# Patient Record
Sex: MALE | Race: WHITE | NOT HISPANIC OR LATINO | Employment: FULL TIME | URBAN - METROPOLITAN AREA
[De-identification: names, ages, dates, MRNs, and addresses within clinical notes are randomized per-mention and may not be internally consistent; named-entity substitution may affect disease eponyms.]

---

## 2024-03-01 ENCOUNTER — OFFICE VISIT (OUTPATIENT)
Dept: FAMILY MEDICINE CLINIC | Facility: CLINIC | Age: 64
End: 2024-03-01
Payer: COMMERCIAL

## 2024-03-01 VITALS
SYSTOLIC BLOOD PRESSURE: 138 MMHG | DIASTOLIC BLOOD PRESSURE: 74 MMHG | HEIGHT: 71 IN | BODY MASS INDEX: 32.76 KG/M2 | WEIGHT: 234 LBS | HEART RATE: 74 BPM | OXYGEN SATURATION: 95 % | TEMPERATURE: 97.5 F

## 2024-03-01 DIAGNOSIS — M79.641 BILATERAL HAND PAIN: ICD-10-CM

## 2024-03-01 DIAGNOSIS — K21.9 GASTROESOPHAGEAL REFLUX DISEASE, UNSPECIFIED WHETHER ESOPHAGITIS PRESENT: ICD-10-CM

## 2024-03-01 DIAGNOSIS — H91.93 BILATERAL HEARING LOSS, UNSPECIFIED HEARING LOSS TYPE: ICD-10-CM

## 2024-03-01 DIAGNOSIS — R06.02 SHORTNESS OF BREATH: ICD-10-CM

## 2024-03-01 DIAGNOSIS — M79.642 BILATERAL HAND PAIN: ICD-10-CM

## 2024-03-01 DIAGNOSIS — R91.1 LUNG NODULE SEEN ON IMAGING STUDY: Primary | ICD-10-CM

## 2024-03-01 PROBLEM — I51.7 CONCENTRIC LEFT VENTRICULAR HYPERTROPHY: Status: ACTIVE | Noted: 2024-03-01

## 2024-03-01 PROBLEM — I10 PRIMARY HYPERTENSION: Status: ACTIVE | Noted: 2024-03-01

## 2024-03-01 PROCEDURE — 99204 OFFICE O/P NEW MOD 45 MIN: CPT | Performed by: NURSE PRACTITIONER

## 2024-03-01 RX ORDER — PANTOPRAZOLE SODIUM 20 MG/1
20 TABLET, DELAYED RELEASE ORAL DAILY
Qty: 30 TABLET | Refills: 2 | Status: SHIPPED | OUTPATIENT
Start: 2024-03-01

## 2024-03-01 RX ORDER — METOPROLOL SUCCINATE 100 MG/1
TABLET, EXTENDED RELEASE ORAL
COMMUNITY
Start: 2024-02-16

## 2024-03-01 RX ORDER — AMLODIPINE AND BENAZEPRIL HYDROCHLORIDE 10; 40 MG/1; MG/1
1 CAPSULE ORAL DAILY
COMMUNITY
Start: 2024-02-19

## 2024-03-01 RX ORDER — HYDRALAZINE HYDROCHLORIDE 100 MG/1
100 TABLET, FILM COATED ORAL 3 TIMES DAILY
COMMUNITY
Start: 2024-02-15

## 2024-03-01 RX ORDER — FUROSEMIDE 20 MG/1
1 TABLET ORAL DAILY
COMMUNITY
Start: 2024-02-23

## 2024-03-01 NOTE — PROGRESS NOTES
"Chief Complaint  Follow-up (He was seen in the the ER for pneumonia on 2/01/2024 for pneumonia. He said he has been sick for awhile and he has had cough and feels like heartburn. )    Subjective        Sukhdeep Boyd presents to [facility] PRIMARY [facility]ARE  History of Present Illness  Patient is here to establish care and follow up from ER visit 2/1/24. He does have a follow up with pulmonology and follow up testing in April. Intermittent cough and sputum, shortness of air with exertion same as when he was diagnosed with pneumonia, has completed medications prescribed at discharge. He did quit smoking at beginning of 2024.      He is having some heart burn, acid reflux, difficulty swallowing liquids. Denies anything worsening or improving, does not drink alcohol. He has been taking zantac with no improvement. His father had esophageal cancer.     Decreased hearing is also a concern, states it is chronic but worsening over last few years. Exposed to loud noises in . Hx of cerumen impaction.     Skin issue bilateral hands between fingers. Possible exposure to chemicals at his employment.     Some ongoing concern with bilateral hand pain, weakness, and stiffness since his pneumonia.     Objective   Vital Signs:  /74 (BP Location: Left arm, Patient Position: Sitting, Cuff Size: Adult)   Pulse 74   Temp 97.5 °F (36.4 °C) (Temporal)   Ht 180.3 cm (71\")   Wt 106 kg (234 lb)   SpO2 95%   BMI 32.64 kg/m²   Estimated body mass index is 32.64 kg/m² as calculated from the following:    Height as of this encounter: 180.3 cm (71\").    Weight as of this encounter: 106 kg (234 lb).       BMI is >= 30 and <35. (Class 1 Obesity). The following options were offered after discussion;: weight loss educational material (shared in after visit summary) and Information on healthy weight added to patient's after visit summary.      Physical Exam  Constitutional:       Appearance: Normal appearance.   HENT:      " Head: Normocephalic.      Right Ear: Ear canal normal. Decreased hearing noted. Tympanic membrane is scarred.      Left Ear: Tympanic membrane and ear canal normal. Decreased hearing noted.   Eyes:      Pupils: Pupils are equal, round, and reactive to light.   Cardiovascular:      Rate and Rhythm: Normal rate and regular rhythm.      Pulses:           Radial pulses are 2+ on the right side and 2+ on the left side.      Heart sounds: Normal heart sounds.   Pulmonary:      Effort: Pulmonary effort is normal.      Breath sounds: Examination of the right-middle field reveals decreased breath sounds. Examination of the left-middle field reveals decreased breath sounds. Examination of the right-lower field reveals decreased breath sounds. Examination of the left-lower field reveals decreased breath sounds.   Musculoskeletal:         General: Normal range of motion.      Cervical back: Normal range of motion.      Right lower leg: No edema.      Left lower leg: No edema.   Skin:     General: Skin is warm and dry.   Neurological:      General: No focal deficit present.      Mental Status: He is alert and oriented to person, place, and time.   Psychiatric:         Mood and Affect: Mood normal.        Result Review :                     Assessment and Plan     Diagnoses and all orders for this visit:    1. Lung nodule seen on imaging study (Primary)  -     Cancel: Ambulatory Referral to [facility]    2. Bilateral hearing loss, unspecified hearing loss type    3. Shortness of breath  -     Comprehensive metabolic panel  -     CBC w AUTO Differential    4. Gastroesophageal reflux disease, unspecified whether esophagitis present  -     Ambulatory Referral to Gastroenterology  -     pantoprazole (PROTONIX) 20 MG EC tablet; Take 1 tablet by mouth Daily.  Dispense: 30 tablet; Refill: 2    5. Bilateral hand pain  -     Uric acid  -     Rheumatoid Factor  -     C-reactive protein  -     CLEMENT  -     Sedimentation  rate, automated    Keep appointment with pulmonology for shortness of breath.     Patient can bring FMLA paperwork if desired.          Follow Up     Return in about 4 weeks (around 3/29/2024) for Recheck.  Patient was given instructions and counseling regarding his condition or for health maintenance advice. Please see specific information pulled into the AVS if appropriate.

## 2024-03-04 ENCOUNTER — TELEPHONE (OUTPATIENT)
Dept: FAMILY MEDICINE CLINIC | Facility: CLINIC | Age: 64
End: 2024-03-04
Payer: COMMERCIAL

## 2024-03-04 LAB
ALBUMIN SERPL-MCNC: 3.9 G/DL (ref 3.9–4.9)
ALBUMIN/GLOB SERPL: 1.7 {RATIO} (ref 1.2–2.2)
ALP SERPL-CCNC: 86 IU/L (ref 44–121)
ALT SERPL-CCNC: 18 IU/L (ref 0–44)
ANA SER QL: NEGATIVE
AST SERPL-CCNC: 19 IU/L (ref 0–40)
BASOPHILS # BLD AUTO: 0 X10E3/UL (ref 0–0.2)
BASOPHILS NFR BLD AUTO: 0 %
BILIRUB SERPL-MCNC: 0.9 MG/DL (ref 0–1.2)
BUN SERPL-MCNC: 14 MG/DL (ref 8–27)
BUN/CREAT SERPL: 12 (ref 10–24)
CALCIUM SERPL-MCNC: 8.8 MG/DL (ref 8.6–10.2)
CHLORIDE SERPL-SCNC: 102 MMOL/L (ref 96–106)
CO2 SERPL-SCNC: 25 MMOL/L (ref 20–29)
CREAT SERPL-MCNC: 1.14 MG/DL (ref 0.76–1.27)
CRP SERPL-MCNC: 60 MG/L (ref 0–10)
EGFRCR SERPLBLD CKD-EPI 2021: 73 ML/MIN/1.73
EOSINOPHIL # BLD AUTO: 0.4 X10E3/UL (ref 0–0.4)
EOSINOPHIL NFR BLD AUTO: 6 %
ERYTHROCYTE [DISTWIDTH] IN BLOOD BY AUTOMATED COUNT: 12.2 % (ref 11.6–15.4)
ERYTHROCYTE [SEDIMENTATION RATE] IN BLOOD BY WESTERGREN METHOD: 28 MM/HR (ref 0–30)
GLOBULIN SER CALC-MCNC: 2.3 G/DL (ref 1.5–4.5)
GLUCOSE SERPL-MCNC: 112 MG/DL (ref 70–99)
HCT VFR BLD AUTO: 37.7 % (ref 37.5–51)
HGB BLD-MCNC: 12.8 G/DL (ref 13–17.7)
IMM GRANULOCYTES # BLD AUTO: 0 X10E3/UL (ref 0–0.1)
IMM GRANULOCYTES NFR BLD AUTO: 1 %
LYMPHOCYTES # BLD AUTO: 1.1 X10E3/UL (ref 0.7–3.1)
LYMPHOCYTES NFR BLD AUTO: 18 %
MCH RBC QN AUTO: 29.4 PG (ref 26.6–33)
MCHC RBC AUTO-ENTMCNC: 34 G/DL (ref 31.5–35.7)
MCV RBC AUTO: 87 FL (ref 79–97)
MONOCYTES # BLD AUTO: 0.7 X10E3/UL (ref 0.1–0.9)
MONOCYTES NFR BLD AUTO: 11 %
NEUTROPHILS # BLD AUTO: 3.9 X10E3/UL (ref 1.4–7)
NEUTROPHILS NFR BLD AUTO: 64 %
PLATELET # BLD AUTO: 139 X10E3/UL (ref 150–450)
POTASSIUM SERPL-SCNC: 3.6 MMOL/L (ref 3.5–5.2)
PROT SERPL-MCNC: 6.2 G/DL (ref 6–8.5)
RBC # BLD AUTO: 4.35 X10E6/UL (ref 4.14–5.8)
RHEUMATOID FACT SERPL-ACNC: 11.1 IU/ML
SODIUM SERPL-SCNC: 138 MMOL/L (ref 134–144)
URATE SERPL-MCNC: 4.8 MG/DL (ref 3.8–8.4)
WBC # BLD AUTO: 6.1 X10E3/UL (ref 3.4–10.8)

## 2024-03-04 NOTE — TELEPHONE ENCOUNTER
[facility]MT[facility]    **HUB/FO** MAY RELAY MESSAGE    ----- Message from YANI Zavala sent at 3/4/2024  1:01 PM EST -----    Let patient know that his labs for arthritis is negative for immune related arthritis. His hemoglobin is just slightly low, we can recheck at follow up to make sure it improves. Thanks.

## 2024-03-11 ENCOUNTER — TELEPHONE (OUTPATIENT)
Dept: FAMILY MEDICINE CLINIC | Facility: CLINIC | Age: 64
End: 2024-03-11
Payer: COMMERCIAL

## 2024-03-11 NOTE — TELEPHONE ENCOUNTER
Spoke melany Valles-she wants an extension on his FMLA due to his recovery being stalled.  He is still experiencing difficulty breathing and on/off fever.  She is also wondering if you would send in an inhaler for his breathing?  Please advise.    Dry cough  Can't lie flat or on side without coughing terribly.      [facility] YOGI Valles notified and voiced understanding.  Pt scheduled for 3/14-9:45 am at [facility].  She would like a call when we receive fax of new FMLA.  [facility] YOGI

## 2024-03-11 NOTE — TELEPHONE ENCOUNTER
Caller: fely [email or web address]    Relationship: Emergency Contact    Best call back number: [phone]    Who are you requesting to speak with (clinical staff, provider,  specific staff member): HARPREET AMADOR     What was the call regarding: EXTENDING FMLA PAPERWORK

## 2024-03-14 ENCOUNTER — OFFICE VISIT (OUTPATIENT)
Dept: FAMILY MEDICINE CLINIC | Facility: CLINIC | Age: 64
End: 2024-03-14
Payer: COMMERCIAL

## 2024-03-14 VITALS
BODY MASS INDEX: 32.03 KG/M2 | SYSTOLIC BLOOD PRESSURE: 100 MMHG | OXYGEN SATURATION: 95 % | WEIGHT: 228.8 LBS | DIASTOLIC BLOOD PRESSURE: 60 MMHG | HEART RATE: 75 BPM | TEMPERATURE: 98 F | HEIGHT: 71 IN

## 2024-03-14 DIAGNOSIS — R05.3 CHRONIC COUGH: Primary | ICD-10-CM

## 2024-03-14 PROCEDURE — 99213 OFFICE O/P EST LOW 20 MIN: CPT | Performed by: NURSE PRACTITIONER

## 2024-03-14 RX ORDER — BENZONATATE 100 MG/1
100 CAPSULE ORAL 3 TIMES DAILY PRN
Qty: 30 CAPSULE | Refills: 0 | Status: SHIPPED | OUTPATIENT
Start: 2024-03-14

## 2024-03-14 RX ORDER — DEXTROMETHORPHAN HYDROBROMIDE AND PROMETHAZINE HYDROCHLORIDE 15; 6.25 MG/5ML; MG/5ML
5 SYRUP ORAL 4 TIMES DAILY PRN
Qty: 180 ML | Refills: 0 | Status: SHIPPED | OUTPATIENT
Start: 2024-03-14

## 2024-03-14 NOTE — PROGRESS NOTES
"Chief Complaint  Cough (X 1 week coughing and throwing up )    Subjective        Sukhdeep Boyd presents to [facility] PRIMARY CARE  Cough    Patient is following up. Still having coughing spells. He is still having clear phlegm. Still seems to be worse at night. He is having some pain due to the coughing. He does occasionally throw up if he has eaten prior to a coughing spell. He notices if he reclines any or lays down the cough is worse.     Also [place] some further [place] for employer.   Objective   Vital Signs:  /60 (BP Location: Left arm, Patient Position: Sitting, Cuff Size: Adult)   Pulse 75   Temp 98 °F (36.7 °C)   Ht 180.3 cm (71\")   Wt 104 kg (228 lb 12.8 oz)   SpO2 95%   BMI 31.91 kg/m²   Estimated body mass index is 31.91 kg/m² as calculated from the following:    Height as of this encounter: 180.3 cm (71\").    Weight as of this encounter: 104 kg (228 lb 12.8 oz).               Physical Exam  Constitutional:       Appearance: Normal appearance.   HENT:      Head: Normocephalic.   Eyes:      Extraocular Movements: Extraocular movements intact.      Pupils: Pupils are equal, round, and reactive to light.   Cardiovascular:      Rate and Rhythm: Normal rate and regular rhythm.      Heart sounds: Normal heart sounds.   Pulmonary:      Effort: Pulmonary effort is normal.      Breath sounds: Examination of the right-lower field reveals decreased breath sounds. Examination of the left-lower field reveals decreased breath sounds. Decreased breath sounds present.   Musculoskeletal:         General: Normal range of motion.      Cervical back: Normal range of motion.   Skin:     General: Skin is warm and dry.   Neurological:      General: No focal deficit present.      Mental Status: He is alert and oriented to person, place, and time.   Psychiatric:         Mood and Affect: Mood normal.        Result Review :                     Assessment and Plan     Diagnoses and all " orders for this visit:    1. Chronic cough (Primary)  -     benzonatate (Tessalon Perles) 100 MG capsule; Take 1 capsule by mouth 3 (Three) Times a Day As Needed for Cough.  Dispense: 30 capsule; Refill: 0  -     promethazine-dextromethorphan (PROMETHAZINE-DM) 6.25-15 MG/5ML syrup; Take 5 mL by mouth 4 (Four) Times a Day As Needed for Cough.  Dispense: 180 mL; Refill: 0    Defers follow up xray today as he has a pending pulmonology consult in a few weeks.          Follow Up     Return if symptoms worsen or fail to improve.  Patient was given instructions and counseling regarding his condition or for health maintenance advice. Please see specific information pulled into the AVS if appropriate.

## 2024-03-25 ENCOUNTER — TELEPHONE (OUTPATIENT)
Dept: FAMILY MEDICINE CLINIC | Facility: CLINIC | Age: 64
End: 2024-03-25

## 2024-03-25 NOTE — TELEPHONE ENCOUNTER
Caller: ramila fely    Relationship: Emergency Contact    Best call back number:     [phone] (Home)       What form or medical record are you requesting: EXTENSION ON HIS FMLA PAPERWORK  / FOR 2 MORE WEEKS    THE FORMS ARE BEING FAXED OVER AT THIS TIME     DISCHARGED FROM HOSPITAL ON 20TH / HEART FAILURE      Who is requesting this form or medical record from you: EMPLOYER      How would you like to receive the form or medical records (pick-up, mail, fax):   Timeframe paperwork needed: ASAP     Additional notes: CAN YOU CALL AND CONFIRM THAT IT HAS BEEN RECEIVED     3/15/2024  [facility] Cardiac Progressive Care Unit

## 2024-03-25 NOTE — TELEPHONE ENCOUNTER
LEFT MESSAGE THAT PAPER WORK WAS FAXED - IF PATIENT WANTS A COPY HE JUST NEEDS TO STOP AT THE [facility] OFFICE AND REQUEST THE COPY        **HUB/FO** MAY RELAY MESSAGE

## 2024-04-05 ENCOUNTER — OFFICE VISIT (OUTPATIENT)
Dept: FAMILY MEDICINE CLINIC | Facility: CLINIC | Age: 64
End: 2024-04-05
Payer: COMMERCIAL

## 2024-04-05 VITALS
BODY MASS INDEX: 29.4 KG/M2 | DIASTOLIC BLOOD PRESSURE: 60 MMHG | WEIGHT: 210.8 LBS | TEMPERATURE: 97.1 F | OXYGEN SATURATION: 97 % | HEART RATE: 82 BPM | SYSTOLIC BLOOD PRESSURE: 100 MMHG

## 2024-04-05 DIAGNOSIS — R06.02 SHORTNESS OF BREATH: ICD-10-CM

## 2024-04-05 DIAGNOSIS — J90 PLEURAL EFFUSION, BILATERAL: ICD-10-CM

## 2024-04-05 DIAGNOSIS — R05.3 CHRONIC COUGH: ICD-10-CM

## 2024-04-05 DIAGNOSIS — I30.9 ACUTE PERICARDITIS, UNSPECIFIED TYPE: ICD-10-CM

## 2024-04-05 DIAGNOSIS — Z09 HOSPITAL DISCHARGE FOLLOW-UP: Primary | ICD-10-CM

## 2024-04-05 RX ORDER — BENZONATATE 100 MG/1
100 CAPSULE ORAL 3 TIMES DAILY PRN
Qty: 90 CAPSULE | Refills: 0 | Status: SHIPPED | OUTPATIENT
Start: 2024-04-05

## 2024-04-05 NOTE — PROGRESS NOTES
Transitional Care Follow Up Visit  Subjective     Sukhdeep Boyd is a 63 y.o. male who presents for a transitional care management visit.    Within 48 business hours after discharge our office contacted him via telephone to coordinate his care and needs.      I reviewed and discussed the details of that call along with the discharge summary, hospital problems, inpatient lab results, inpatient diagnostic studies, and consultation reports with Sukhdeep.     Current outpatient and discharge medications have been reconciled for the patient.  Reviewed by: YANI Zavala           No data to display              Risk for Readmission (LACE) No data recorded    History of Present Illness   Patient is here to follow up from hospital. Still having difficulty breathing. He is upset as he feels he still is coughing too much. 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 He stopped taking hydralazine as he thinks there is a connection from it being increased to 100mg from 50mg and his fluid build up. He also feels his bp readings actually improved when he stopped that medication. He has a pending CT chest on Wednesday. Patient and significant other feel that he still has right lung fluid that needs removal.  He is also not taking the colchicine due to some feelings of sore throat and some other side effects. He is losing weight and has a decreased appetite.     Course During Hospital Stay:  63-year-old male with a history of hypertension, hypothyroidism, obesity and former smoker presented with shortness of breath. Patient stated that he has been struggling with pneumonia since February and this has not improved despite treatment with antibiotics. He was then again diagnosed with PNA around March 5 but continues to have shortness of breath associated with a cough, fevers (reported Tmax of 101) and dizziness. Patient has tried  over-the-counter medicines for his symptoms with no significant improvement. Patient finally presented to the emergency room due to the severity of his symptoms. He states that he has a stabbing pain in the right chest when he takes a deep breath.     Hospital Course:   -Patient was admitted to the Hospitalist service. Echo revealed pericardial effusion. Cardiology was consulted. He underwent:    --pericardiocentesis 3/16/24 - 650 cc of straw-colored fluid. Drain removed 3/17/24  --f/u ECHO in 2-4 weeks  --colchicine daily, cont 2-4 weeks, can hold if outpatient ECHO normal    He responded well to colchicine and was hemodynamically stable on RA. He will follow up with cardiology in a few weeks. The patient's laboratory data and vital signs remained stable for discharge      The following portions of the patient's history were reviewed and updated as appropriate: allergies, current medications, past family history, past medical history, past social history, past surgical history, and problem list.    Review of Systems    Objective   Physical Exam  Constitutional:       Appearance: Normal appearance. He is ill-appearing.   HENT:      Head: Normocephalic.      Nose: Nose normal.   Eyes:      Extraocular Movements: Extraocular movements intact.      Pupils: Pupils are equal, round, and reactive to light.   Cardiovascular:      Rate and Rhythm: Normal rate and regular rhythm.      Heart sounds: Normal heart sounds.   Pulmonary:      Effort: Pulmonary effort is normal.      Breath sounds: Normal breath sounds.   Musculoskeletal:         General: Normal range of motion.      Cervical back: Normal range of motion.   Skin:     General: Skin is warm and dry.   Neurological:      General: No focal deficit present.      Mental Status: He is alert and oriented to person, place, and time.   Psychiatric:         Mood and Affect: Mood normal.         Assessment & Plan   Problems Addressed this Visit    None  Visit Diagnoses        Hospital discharge follow-up    -  Primary    Acute pericarditis, unspecified type        Relevant Orders    Ambulatory Referral to Cardiology    Shortness of breath        Relevant Orders    Ambulatory Referral to Pulmonology    Pleural effusion, bilateral        Relevant Medications    benzonatate (Tessalon Perles) 100 MG capsule    Chronic cough        Relevant Medications    benzonatate (Tessalon Perles) 100 MG capsule          Diagnoses         Codes Comments    Hospital discharge follow-up    -  Primary ICD-10-CM: Z09  ICD-9-CM: V67.59     Acute pericarditis, unspecified type     ICD-10-CM: I30.9  ICD-9-CM: 420.90     Shortness of breath     ICD-10-CM: R06.02  ICD-9-CM: 786.05     Pleural effusion, bilateral     ICD-10-CM: J90  ICD-9-CM: 511.9     Chronic cough     ICD-10-CM: R05.3  ICD-9-CM: 786.2           Patient is to keep imaging appointments.

## 2024-04-12 ENCOUNTER — OFFICE VISIT (OUTPATIENT)
Dept: CARDIOLOGY | Facility: CLINIC | Age: 64
End: 2024-04-12
Payer: COMMERCIAL

## 2024-04-12 VITALS
WEIGHT: 214 LBS | SYSTOLIC BLOOD PRESSURE: 142 MMHG | HEIGHT: 71 IN | DIASTOLIC BLOOD PRESSURE: 86 MMHG | HEART RATE: 86 BPM | BODY MASS INDEX: 29.96 KG/M2 | OXYGEN SATURATION: 97 %

## 2024-04-12 DIAGNOSIS — I30.0 ACUTE IDIOPATHIC PERICARDITIS: ICD-10-CM

## 2024-04-12 DIAGNOSIS — K21.9 GASTROESOPHAGEAL REFLUX DISEASE, UNSPECIFIED WHETHER ESOPHAGITIS PRESENT: ICD-10-CM

## 2024-04-12 DIAGNOSIS — Z98.890 S/P THORACENTESIS: ICD-10-CM

## 2024-04-12 DIAGNOSIS — I10 PRIMARY HYPERTENSION: Primary | ICD-10-CM

## 2024-04-12 DIAGNOSIS — I31.39 PERICARDIAL EFFUSION: ICD-10-CM

## 2024-04-12 DIAGNOSIS — Z98.890 S/P PERICARDIOCENTESIS: ICD-10-CM

## 2024-04-12 NOTE — PROGRESS NOTES
"      CARDIOLOGY    Ron Cm MD    ENCOUNTER DATE:  04/12/2024    Sukhdeep Boyd / 63 y.o. / male        CHIEF COMPLAINT / REASON FOR OFFICE VISIT     Establish Care (Pericarditis)      HISTORY OF PRESENT ILLNESS       HPI    Sukhdeep Boyd is a 63 y.o. male with HFpEF, pericardial effusion s/p pericardiocentesis, hypertension, overweight, tobacco abuse who presents to establish care as a new patient for pericarditis.    Per CareEverywhere, pt was hospitalized in 2/2024 for pneumonia and then 3/15/2024 - 3/20/2024 at [facility] for new onset of HFpEF with pericardial effusion and pleural effusions. He underwent pericardiocentesis on 316/24 with removal of 650 mL of straw-colored fluid. He was started on colchicine daily. He underwent thoracentesis of left pleural effusion on 3/18 with removal of 1300 mL of clear straw-colored fluid. He was discharged on colchicine 0.6 mg twice daily with order for follow-up echo for surveillance.    Accompanied by wife at the visit today who contributed to medical history. Today, patient has no acute complaints. Works as a truck and crane . Continues to have a cough which started 2 months ago. Persistent dyspnea and R \"lung pain\", overall stable. Reports some associated R-sided chest discomfort with excessive coughing, unsure if any worse; per wife, it appears to be slowly improving.  Denies palpitation, leg edema, PND. Reports some dizziness with over exertion but no syncope or unexpected falls. Unsure if he has low grade fever. Does not recall having had a heart attack or stroke. Former smoker, 0.5 ppd x 47 years and quit at the beginning of the year. Denies alcohol or substance.     REVIEW OF SYSTEMS     Review of Systems   Constitutional: Negative for weight loss.   Cardiovascular:  Positive for chest pain and dyspnea on exertion. Negative for irregular heartbeat, leg swelling, orthopnea, palpitations, paroxysmal nocturnal dyspnea and syncope.   Respiratory:  Positive for cough, " Spoke with Mr. Lynnette Carmona discussed what CTA is and that the insurance verifiers will verify insurance coverage. transferred to central scheduling. "shortness of breath and sputum production.    Hematologic/Lymphatic: Negative for bleeding problem.   Musculoskeletal:  Negative for falls.   Gastrointestinal:  Negative for hematochezia and melena.   Genitourinary:  Negative for hematuria.   Neurological:  Negative for dizziness and light-headedness.   Psychiatric/Behavioral:  Negative for altered mental status.            MEDICATIONS      Outpatient Encounter Medications as of 4/12/2024   Medication Sig Dispense Refill    amLODIPine-benazepril (LOTREL) 10-40 MG per capsule Take 1 capsule by mouth Daily.      benzonatate (Tessalon Perles) 100 MG capsule Take 1 capsule by mouth 3 (Three) Times a Day As Needed for Cough. 90 capsule 0    furosemide (LASIX) 20 MG tablet Take 1 tablet by mouth Daily.      metoprolol succinate XL (TOPROL-XL) 100 MG 24 hr tablet        No facility-administered encounter medications on file as of 4/12/2024.         VITAL SIGNS     Visit Vitals  /86   Pulse 86   Ht 180.3 cm (71\")   Wt 97.1 kg (214 lb)   SpO2 97%   BMI 29.85 kg/m²         Wt Readings from Last 3 Encounters:   04/12/24 97.1 kg (214 lb)   04/05/24 95.6 kg (210 lb 12.8 oz)   03/14/24 104 kg (228 lb 12.8 oz)     Body mass index is 29.85 kg/m².      PHYSICAL EXAMINATION     Vitals and nursing note reviewed.   Constitutional:       Appearance: Not in distress.   Neck:      Vascular: No JVR.   Pulmonary:      Effort: Pulmonary effort is normal.      Breath sounds: Normal breath sounds. No wheezing. No rhonchi. No rales.   Cardiovascular:      Normal rate. Regular rhythm.      Murmurs: There is no murmur.   Edema:     Peripheral edema absent.   Abdominal:      General: There is no distension.      Palpations: Abdomen is soft.      Tenderness: There is no abdominal tenderness.   Musculoskeletal:      Cervical back: Neck supple. Skin:     General: Skin is cool.   Neurological:      Mental Status: Alert and oriented to person, place and time.           REVIEWED DATA       ECG 12 " "Lead    Date/Time: 4/12/2024 12:09 PM  Performed by: Ron Cm MD    Authorized by: Ron Cm MD  Comparison: not compared with previous ECG   Rhythm: sinus rhythm  Ectopy: atrial premature contractions  Conduction: conduction normal  ST Segments: ST segments normal  T Waves: T waves normal  QRS axis: normal  Other findings: left atrial abnormality    Clinical impression: non-specific ECG          Lab Results   Component Value Date    WBC 7.56 03/20/2024    HGB 11.0 (L) 03/20/2024    HCT 33.0 (L) 03/20/2024    MCV 82.3 03/20/2024     03/20/2024       No results found for: \"HGBA1C\"    Lab Results   Component Value Date    GLUCOSE 112 (H) 03/01/2024    BUN 14 03/01/2024    CREATININE 1.14 03/01/2024    EGFRRESULT 73 03/01/2024    BCR 12 03/01/2024    K 4.2 03/18/2024    CO2 25 03/01/2024    CALCIUM 8.8 03/01/2024    PROTENTOTREF 6.2 03/01/2024    ALBUMIN 3.9 03/01/2024    BILITOT 0.9 03/01/2024    AST 19 03/01/2024    ALT 18 03/01/2024       No results found for: \"CHOL\", \"CHLPL\", \"TRIG\", \"HDL\", \"LDL\", \"LDLDIRECT\"          ASSESSMENT & PLAN     Diagnoses and all orders for this visit:    1. Primary hypertension (Primary)  -     XR Chest 2 View; Future  -     Basic Metabolic Panel; Future  -     CBC & Differential; Future  -     High Sensitivity CRP; Future  -     Sedimentation Rate; Future  -     Lipid Panel; Future  -     ECG 12 Lead    2. Gastroesophageal reflux disease, unspecified whether esophagitis present  -     XR Chest 2 View; Future  -     Basic Metabolic Panel; Future  -     CBC & Differential; Future  -     High Sensitivity CRP; Future  -     Sedimentation Rate; Future  -     Lipid Panel; Future    3. Pericardial effusion  -     ECG 12 Lead    4. S/P pericardiocentesis    5. S/P thoracentesis    6. Acute idiopathic pericarditis       Pericardial effusion s/p pericardiocentesis: Recent hospitalization at [facility] 3/2024 for dyspnea with new onset pericardial effusion requiring pericardiocentesis " and removal of 650 cc of straw-colored fluid.  Patient had symptoms concerning for associated pericarditis and was on colchicine 0.6 bid but pt self-discontinued due to coughing fit and sore throat.  Unclear if any of his persistent coughing and dyspnea are related to pericarditis, EKG today in office showed NSR with PACs but no significant ST changes.  Patient reports just getting echo done at [facility] today, no report or images available for review and we will attempt to follow-up if able.  We will also order repeat labs including inflammatory markers as well as 2 view CXR today at [facility].  Pleural effusion s/p thoracentesis, CXR as above.  Defer infectious surveillance and or treatment to PCP in the setting of recent admission for pneumonia in February.  HFpEF: Diagnosed recently in the setting of pericardial effusion.  On PO furosemide 20 daily, BP control see below.  Hypertension: In office /86, elevated, HR 86 but pt has not taken his BP medications. On amlodipine-benazepril 10-40 daily, Toprol  daily in addition to diuretic as above.  Pt was recently on hydralazine which he has self-discontinued.  Encouraged patient to take his medications earlier in the morning and call back in 1 week with additional vitals to help guide the need for therapy escalation, specifically beta-blocker dose increase.  ASCVD risk: No lipids on file, we will order along with BMET, CBC, and inflammatory markers as above.  Tobacco abuse: Longstanding history of abuse with over 40 years, patient quit again earlier this year, emphasized importance of complete and sustained abstinence.  Overweight/obesity: BMI 30. Discussed lifestyle modification including dietary changes to assist with weight loss.    I spent 42 minutes caring for Sukhdeep on this date of service. This time includes time spent by me in the following activities: preparing for the visit, reviewing tests, obtaining and/or reviewing a separately obtained history,  performing a medically appropriate examination and/or evaluation, counseling and educating the patient/family/caregiver, and ordering medications, tests, or procedures.     Ron Cm MD. PhD. [facility]  04/12/24  12:10 EDT    Part of this note may be an electronic transcription/translation of spoken language to printed text using the Dragon dictation system.

## 2024-04-15 ENCOUNTER — LAB (OUTPATIENT)
Dept: LAB | Facility: HOSPITAL | Age: 64
End: 2024-04-15
Payer: COMMERCIAL

## 2024-04-15 ENCOUNTER — HOSPITAL ENCOUNTER (OUTPATIENT)
Dept: GENERAL RADIOLOGY | Facility: HOSPITAL | Age: 64
Discharge: HOME | End: 2024-04-15
Payer: COMMERCIAL

## 2024-04-15 ENCOUNTER — TELEPHONE (OUTPATIENT)
Dept: FAMILY MEDICINE CLINIC | Facility: CLINIC | Age: 64
End: 2024-04-15

## 2024-04-15 DIAGNOSIS — I10 PRIMARY HYPERTENSION: ICD-10-CM

## 2024-04-15 DIAGNOSIS — K21.9 GASTROESOPHAGEAL REFLUX DISEASE, UNSPECIFIED WHETHER ESOPHAGITIS PRESENT: ICD-10-CM

## 2024-04-15 DIAGNOSIS — I30.9 ACUTE PERICARDITIS, UNSPECIFIED TYPE: Primary | ICD-10-CM

## 2024-04-15 LAB
ANION GAP SERPL CALCULATED.3IONS-SCNC: 6 MMOL/L (ref 5–15)
BASOPHILS # BLD AUTO: 0.02 10*3/MM3 (ref 0–0.2)
BASOPHILS NFR BLD AUTO: 0.3 % (ref 0–1.5)
BUN SERPL-MCNC: 12 MG/DL (ref 8–23)
BUN/CREAT SERPL: 9.9 (ref 7–25)
CALCIUM SPEC-SCNC: 9.3 MG/DL (ref 8.6–10.5)
CHLORIDE SERPL-SCNC: 102 MMOL/L (ref 98–107)
CHOLEST SERPL-MCNC: 154 MG/DL (ref 0–200)
CO2 SERPL-SCNC: 28 MMOL/L (ref 22–29)
CREAT SERPL-MCNC: 1.21 MG/DL (ref 0.76–1.27)
CRP SERPL-MCNC: 1.05 MG/DL (ref 0.01–0.5)
DEPRECATED RDW RBC AUTO: 40.4 FL (ref 37–54)
EGFRCR SERPLBLD CKD-EPI 2021: 67.7 ML/MIN/1.73
EOSINOPHIL # BLD AUTO: 0.19 10*3/MM3 (ref 0–0.4)
EOSINOPHIL NFR BLD AUTO: 3.2 % (ref 0.3–6.2)
ERYTHROCYTE [DISTWIDTH] IN BLOOD BY AUTOMATED COUNT: 13.9 % (ref 12.3–15.4)
ERYTHROCYTE [SEDIMENTATION RATE] IN BLOOD: 13 MM/HR (ref 0–20)
GLUCOSE SERPL-MCNC: 119 MG/DL (ref 65–99)
HCT VFR BLD AUTO: 35.2 % (ref 37.5–51)
HDLC SERPL-MCNC: 25 MG/DL (ref 40–60)
HGB BLD-MCNC: 11 G/DL (ref 13–17.7)
IMM GRANULOCYTES # BLD AUTO: 0.04 10*3/MM3 (ref 0–0.05)
IMM GRANULOCYTES NFR BLD AUTO: 0.7 % (ref 0–0.5)
LDLC SERPL CALC-MCNC: 96 MG/DL (ref 0–100)
LDLC/HDLC SERPL: 3.66 {RATIO}
LYMPHOCYTES # BLD AUTO: 1.35 10*3/MM3 (ref 0.7–3.1)
LYMPHOCYTES NFR BLD AUTO: 22.6 % (ref 19.6–45.3)
MCH RBC QN AUTO: 25.3 PG (ref 26.6–33)
MCHC RBC AUTO-ENTMCNC: 31.3 G/DL (ref 31.5–35.7)
MCV RBC AUTO: 81.1 FL (ref 79–97)
MONOCYTES # BLD AUTO: 0.61 10*3/MM3 (ref 0.1–0.9)
MONOCYTES NFR BLD AUTO: 10.2 % (ref 5–12)
NEUTROPHILS NFR BLD AUTO: 3.76 10*3/MM3 (ref 1.7–7)
NEUTROPHILS NFR BLD AUTO: 63 % (ref 42.7–76)
NRBC BLD AUTO-RTO: 0 /100 WBC (ref 0–0.2)
PLATELET # BLD AUTO: 167 10*3/MM3 (ref 140–450)
PMV BLD AUTO: 8 FL (ref 6–12)
POTASSIUM SERPL-SCNC: 4.1 MMOL/L (ref 3.5–5.2)
RBC # BLD AUTO: 4.34 10*6/MM3 (ref 4.14–5.8)
SODIUM SERPL-SCNC: 136 MMOL/L (ref 136–145)
TRIGL SERPL-MCNC: 187 MG/DL (ref 0–150)
VLDLC SERPL-MCNC: 33 MG/DL (ref 5–40)
WBC NRBC COR # BLD AUTO: 5.97 10*3/MM3 (ref 3.4–10.8)

## 2024-04-15 PROCEDURE — 85652 RBC SED RATE AUTOMATED: CPT

## 2024-04-15 PROCEDURE — 80048 BASIC METABOLIC PNL TOTAL CA: CPT

## 2024-04-15 PROCEDURE — 71046 X-RAY EXAM CHEST 2 VIEWS: CPT

## 2024-04-15 PROCEDURE — 36415 COLL VENOUS BLD VENIPUNCTURE: CPT

## 2024-04-15 PROCEDURE — 85025 COMPLETE CBC W/AUTO DIFF WBC: CPT

## 2024-04-15 PROCEDURE — 86141 C-REACTIVE PROTEIN HS: CPT

## 2024-04-15 PROCEDURE — 80061 LIPID PANEL: CPT

## 2024-04-15 RX ORDER — COLCHICINE 0.6 MG/1
0.6 TABLET ORAL DAILY
Qty: 30 TABLET | Refills: 2 | Status: SHIPPED | OUTPATIENT
Start: 2024-04-15 | End: 2024-05-15

## 2024-04-15 RX ORDER — IBUPROFEN 600 MG/1
600 TABLET ORAL 2 TIMES DAILY
Qty: 60 TABLET | Refills: 2 | Status: SHIPPED | OUTPATIENT
Start: 2024-04-15

## 2024-04-15 NOTE — TELEPHONE ENCOUNTER
Caller: Sukhdeep Boyd    Relationship: Self    Best call back number: [phone]     What form or medical record are you requesting: [employer] DID NOT GET A COPY THE GROUP SHORT TERM DISABILITY CLAIM THAT WAS FILLED OUT.    PATIENT HAS A COPY OF THIS AND IS GOING TO STOP BY OFFICE 4-17-24 FOR OFFICE TO MAKE COPY OF IT AND RESEND TO DISABILITY INSURANCE COMPANY.      Who is requesting this form or medical record from you:     How would you like to receive the form or medical records (pick-up, mail, fax): [phone]   is the fax number:     Timeframe paperwork needed: ASAP

## 2024-04-15 NOTE — TELEPHONE ENCOUNTER
Called and left a VM. Will continue to try to reach pt.    Triage-Please send to Dr. mC to place orders once we've talked to the patient.    Toyin Ayoub RN  Triage [facility]  04/15/24 14:22 EDT

## 2024-04-15 NOTE — TELEPHONE ENCOUNTER
----- Message from Ron Cm MD sent at 4/15/2024 12:36 PM EDT -----  High-sensitivity CRP still elevated, the other labs overall reassuring apart from stable mild anemia.  With his clinical history, we remain concerned about persistent pericarditis.  Please start him back on colchicine at reduced dose 0.6mg once daily given possible reported intolerance, also start him on ibuprofen 600mg bid, repeat BMET and hs-CRP in 1 week and have patient call back at that time with symptom update.  Also CCing PCP Eveline Brown as update.  We have also placed a request last week to obtain echo images from [facility] and are still waiting.

## 2024-04-15 NOTE — TELEPHONE ENCOUNTER
Dr. Cm,    I have spoken with the patient.  I have given him the instructions for both the colchicine and ibuprofen.  He will get labs in a week.      I have pended the meds and lab orders.  Can you please fill in the hard stops (quantity and refills) on the medications please and then sign.  His pharmacy is correct in EP[facility], [facility] name.    Thanks!!  Mary Alice Aranda RN  [facility] Triage  04/15/24 15:05 EDT

## 2024-04-19 ENCOUNTER — TELEPHONE (OUTPATIENT)
Dept: CARDIOLOGY | Facility: CLINIC | Age: 64
End: 2024-04-19
Payer: COMMERCIAL

## 2024-04-19 NOTE — TELEPHONE ENCOUNTER
----- Message from Ron Cm MD sent at 4/18/2024  4:14 PM EDT -----  Small pleural effusion without evidence of congestive heart failure.  Questionable focal pneumonia.  I also reviewed his echo images from [place] around the time of pericardiocentesis.  We are awaiting repeat echo at [facility].  Please inquire about symptoms as we recently started him on ibuprofen and low-dose colchicine.  Also CCing PCP here for extracardiac evaluation and consideration of repeat CXR in 2 weeks.

## 2024-04-19 NOTE — TELEPHONE ENCOUNTER
Notified patient of results/recommendations. Patient verbalized understanding. He states that he is still having coughing fits and discomfort, but the discomfort is a little better.     Azalea Zayas RN  [place] [facility]

## 2024-04-26 ENCOUNTER — HOSPITAL ENCOUNTER (OUTPATIENT)
Dept: GENERAL RADIOLOGY | Facility: HOSPITAL | Age: 64
Discharge: HOME | End: 2024-04-26
Payer: COMMERCIAL

## 2024-04-26 ENCOUNTER — LAB (OUTPATIENT)
Dept: LAB | Facility: HOSPITAL | Age: 64
End: 2024-04-26
Payer: COMMERCIAL

## 2024-04-26 DIAGNOSIS — J90 PLEURAL EFFUSION: Primary | ICD-10-CM

## 2024-04-26 DIAGNOSIS — I30.9 ACUTE PERICARDITIS, UNSPECIFIED TYPE: ICD-10-CM

## 2024-04-26 DIAGNOSIS — I31.39 PERICARDIAL EFFUSION: ICD-10-CM

## 2024-04-26 LAB
ANION GAP SERPL CALCULATED.3IONS-SCNC: 9 MMOL/L (ref 5–15)
BUN SERPL-MCNC: 15 MG/DL (ref 8–23)
BUN/CREAT SERPL: 13.8 (ref 7–25)
CALCIUM SPEC-SCNC: 9 MG/DL (ref 8.6–10.5)
CHLORIDE SERPL-SCNC: 103 MMOL/L (ref 98–107)
CO2 SERPL-SCNC: 25 MMOL/L (ref 22–29)
CREAT SERPL-MCNC: 1.09 MG/DL (ref 0.76–1.27)
CRP SERPL-MCNC: 0.22 MG/DL (ref 0.01–0.5)
EGFRCR SERPLBLD CKD-EPI 2021: 76.7 ML/MIN/1.73
GLUCOSE SERPL-MCNC: 154 MG/DL (ref 65–99)
POTASSIUM SERPL-SCNC: 3.5 MMOL/L (ref 3.5–5.2)
SODIUM SERPL-SCNC: 137 MMOL/L (ref 136–145)

## 2024-04-26 PROCEDURE — 80048 BASIC METABOLIC PNL TOTAL CA: CPT

## 2024-04-26 PROCEDURE — 86141 C-REACTIVE PROTEIN HS: CPT

## 2024-04-26 PROCEDURE — 71046 X-RAY EXAM CHEST 2 VIEWS: CPT

## 2024-04-26 PROCEDURE — 36415 COLL VENOUS BLD VENIPUNCTURE: CPT

## 2024-04-29 ENCOUNTER — TRANSCRIBE ORDERS (OUTPATIENT)
Dept: ADMINISTRATIVE | Facility: HOSPITAL | Age: 64
End: 2024-04-29
Payer: COMMERCIAL

## 2024-04-29 ENCOUNTER — TELEPHONE (OUTPATIENT)
Dept: CARDIOLOGY | Facility: CLINIC | Age: 64
End: 2024-04-29
Payer: COMMERCIAL

## 2024-04-29 DIAGNOSIS — R91.1 SOLITARY LUNG NODULE: Primary | ICD-10-CM

## 2024-04-29 NOTE — TELEPHONE ENCOUNTER
Dr. Cm,    Pt called back. Went over results and recommendations.    Pt said his B/P and HR a couple days ago were 133/80, HR 74. He has almost stopped taking the ibuprofen all together. He has had good results with colchicine and continues with that medication per your recommendations.    Thank you,    Toyin Ayoub, RN  Triage [facility] [place]  04/29/24 11:54 EDT

## 2024-04-29 NOTE — TELEPHONE ENCOUNTER
Called and left a VM. Will continue to try to reach pt.    Toyin Ayoub, RN  Triage [facility] [place]  04/29/24 09:56 EDT

## 2024-05-01 NOTE — TELEPHONE ENCOUNTER
Notified pt of recommendations from Dr. Cm. Pt verbalized understanding.    Thank you,    Toyin Ayoub, RN  Triage [facility] [place]  05/01/24 09:59 EDT

## 2024-05-02 ENCOUNTER — APPOINTMENT (OUTPATIENT)
Dept: OTHER | Facility: HOSPITAL | Age: 64
End: 2024-05-02
Payer: COMMERCIAL

## 2024-05-02 ENCOUNTER — HOSPITAL ENCOUNTER (OUTPATIENT)
Dept: PET IMAGING | Facility: HOSPITAL | Age: 64
Discharge: HOME | End: 2024-05-02
Payer: COMMERCIAL

## 2024-05-02 DIAGNOSIS — R91.1 SOLITARY LUNG NODULE: ICD-10-CM

## 2024-05-02 LAB — GLUCOSE BLDC GLUCOMTR-MCNC: 145 MG/DL (ref 70–130)

## 2024-05-02 PROCEDURE — 0 FLUDEOXYGLUCOSE F18 SOLUTION: Performed by: INTERNAL MEDICINE

## 2024-05-02 PROCEDURE — A9552 F18 FDG: HCPCS | Performed by: INTERNAL MEDICINE

## 2024-05-02 PROCEDURE — 82948 REAGENT STRIP/BLOOD GLUCOSE: CPT

## 2024-05-02 PROCEDURE — 78815 PET IMAGE W/CT SKULL-THIGH: CPT

## 2024-05-02 RX ADMIN — FLUDEOXYGLUCOSE F 18 1 DOSE: 200 INJECTION, SOLUTION INTRAVENOUS at 06:55

## 2024-05-31 ENCOUNTER — OFFICE VISIT (OUTPATIENT)
Dept: FAMILY MEDICINE CLINIC | Facility: CLINIC | Age: 64
End: 2024-05-31
Payer: COMMERCIAL

## 2024-05-31 VITALS
HEIGHT: 71 IN | TEMPERATURE: 98.4 F | WEIGHT: 228.4 LBS | HEART RATE: 79 BPM | DIASTOLIC BLOOD PRESSURE: 70 MMHG | BODY MASS INDEX: 31.98 KG/M2 | OXYGEN SATURATION: 96 % | SYSTOLIC BLOOD PRESSURE: 122 MMHG

## 2024-05-31 DIAGNOSIS — R06.02 SHORTNESS OF BREATH: ICD-10-CM

## 2024-05-31 DIAGNOSIS — J90 PLEURAL EFFUSION, BILATERAL: Primary | ICD-10-CM

## 2024-05-31 DIAGNOSIS — R53.1 GENERALIZED WEAKNESS: ICD-10-CM

## 2024-05-31 PROCEDURE — 99214 OFFICE O/P EST MOD 30 MIN: CPT | Performed by: NURSE PRACTITIONER

## 2024-05-31 RX ORDER — IBUPROFEN 600 MG/1
600 TABLET ORAL 2 TIMES DAILY
Qty: 60 TABLET | Refills: 2 | Status: SHIPPED | OUTPATIENT
Start: 2024-05-31

## 2024-05-31 NOTE — PROGRESS NOTES
"Chief Complaint  Shortness of Breath (Difficulty taking deep breath - seen cardiologist) and Pain (Pain multiple joints)    Subjective        Sukhdeep Boyd presents to [facility]  History of Present Illness  Patient is here to discuss ongoing care. He is still having shortness of breath with activity and getting dizzy at times. Pulmonary specialist reported to him he has emphysema. He still has effusions noted on recent xray and pet scan. No malignant findings per PET scan.    Patient is concerned for returning to work on Monday. He doesn't feel he has the strength or stamina to climb a ladder and walk long distances. He has intermittent hand pain and is concerned for that as well.     He is concerned because when it rained he experienced a fever and weakness and has no idea why it affected him so severely.   Objective   Vital Signs:  /70 (BP Location: Left arm, Patient Position: Sitting, Cuff Size: Large Adult)   Pulse 79   Temp 98.4 °F (36.9 °C) (Temporal)   Ht 180.3 cm (71\")   Wt 104 kg (228 lb 6.4 oz)   SpO2 96%   BMI 31.86 kg/m²   Estimated body mass index is 31.86 kg/m² as calculated from the following:    Height as of this encounter: 180.3 cm (71\").    Weight as of this encounter: 104 kg (228 lb 6.4 oz).               Physical Exam  Constitutional:       Appearance: Normal appearance.   HENT:      Head: Normocephalic.      Nose: Nose normal.   Eyes:      Extraocular Movements: Extraocular movements intact.      Pupils: Pupils are equal, round, and reactive to light.   Cardiovascular:      Rate and Rhythm: Normal rate and regular rhythm.   Pulmonary:      Effort: Pulmonary effort is normal.      Breath sounds: Normal breath sounds.   Musculoskeletal:         General: Normal range of motion.      Cervical back: Normal range of motion.   Skin:     General: Skin is warm and dry.   Neurological:      General: No focal deficit present.      Mental Status: He is alert and " oriented to person, place, and time.   Psychiatric:         Mood and Affect: Mood normal.        Result Review :                     Assessment and Plan     Diagnoses and all orders for this visit:    1. Pleural effusion, bilateral (Primary)  -     ibuprofen (ADVIL,MOTRIN) 600 MG tablet; Take 1 tablet by mouth 2 (Two) Times a Day.  Dispense: 60 tablet; Refill: 2    2. Generalized weakness    3. Shortness of breath      Discussed adding tylenol arthritis to pain regimen as needed. Patient defers referral to hand specialist or imaging at this time.    Patient will bring any FMLA or disability paperwork needed into office for completion.        Follow Up     Return if symptoms worsen or fail to improve.  Patient was given instructions and counseling regarding his condition or for health maintenance advice. Please see specific information pulled into the AVS if appropriate.

## 2024-07-12 ENCOUNTER — OFFICE VISIT (OUTPATIENT)
Dept: CARDIOLOGY | Facility: CLINIC | Age: 64
End: 2024-07-12
Payer: COMMERCIAL

## 2024-07-12 VITALS
SYSTOLIC BLOOD PRESSURE: 130 MMHG | DIASTOLIC BLOOD PRESSURE: 80 MMHG | WEIGHT: 231 LBS | HEART RATE: 59 BPM | OXYGEN SATURATION: 97 % | HEIGHT: 71 IN | BODY MASS INDEX: 32.34 KG/M2

## 2024-07-12 DIAGNOSIS — I31.39 PERICARDIAL EFFUSION: ICD-10-CM

## 2024-07-12 DIAGNOSIS — Z98.890 S/P PERICARDIOCENTESIS: ICD-10-CM

## 2024-07-12 DIAGNOSIS — I10 PRIMARY HYPERTENSION: Primary | ICD-10-CM

## 2024-07-12 DIAGNOSIS — K21.9 GASTROESOPHAGEAL REFLUX DISEASE, UNSPECIFIED WHETHER ESOPHAGITIS PRESENT: ICD-10-CM

## 2024-07-12 DIAGNOSIS — Z98.890 S/P THORACENTESIS: ICD-10-CM

## 2024-07-12 DIAGNOSIS — I51.7 CONCENTRIC LEFT VENTRICULAR HYPERTROPHY: ICD-10-CM

## 2024-07-12 NOTE — TELEPHONE ENCOUNTER
Caller: Sukhdeep Boyd    Relationship: Self    Best call back number: [phone]    Requested Prescriptions:   Requested Prescriptions     Pending Prescriptions Disp Refills    amLODIPine-benazepril (LOTREL) 10-40 MG per capsule       Sig: Take 1 capsule by mouth Daily.    metoprolol succinate XL (TOPROL-XL) 100 MG 24 hr tablet       Sig: Take 1 tablet by mouth Daily.        Pharmacy where request should be sent:  PHARMACY [place] [address] [address], [place] [address] - [phone] [facility] [phone]      Last office visit with prescribing clinician: 7/12/2024   Last telemedicine visit with prescribing clinician: Visit date not found   Next office visit with prescribing clinician: 1/17/2025     Additional details provided by patient: NEED 90 DAY SUPPLY    Does the patient have less than a 3 day supply:  [] Yes  [x] No    Would you like a call back once the refill request has been completed: [] Yes [x] No    If the office needs to give you a call back, can they leave a voicemail: [x] Yes [] No    Jayashree Nino Rep   07/12/24 12:41 EDT

## 2024-07-12 NOTE — PROGRESS NOTES
CARDIOLOGY    Ron Cm MD    ENCOUNTER DATE:  04/12/2024    Sukhdeep Boyd / 64 y.o. / male        CHIEF COMPLAINT / REASON FOR OFFICE VISIT     Hypertension      HISTORY OF PRESENT ILLNESS       Hypertension  Associated symptoms include chest pain and shortness of breath. Pertinent negatives include no orthopnea, palpitations or PND.     Sukhdeep Boyd is a 64 y.o. male with HFpEF, pericardial effusion s/p pericardiocentesis, hypertension, overweight, tobacco abuse who presents to [facility] as a new patient for pericarditis.    Per CareEverywhere, pt was hospitalized in 2/2024 for pneumonia and then 3/15/2024 - 3/20/2024 at [place] for new onset of HFpEF with pericardial effusion and pleural effusions. He underwent pericardiocentesis on 316/24 with removal of 650 mL of straw-colored fluid. He was started on colchicine daily. He underwent thoracentesis of left pleural effusion on 3/18 with removal of 1300 mL of clear straw-colored fluid. He was discharged on colchicine 0.6 mg twice daily.  Follow-up echo 4/12/2024 showed no residual pericardial effusion from [facility].    Accompanied by wife at the visit today who contributed to medical history. Today, patient has no acute complaints. Works as a truck and crane . Continues to have dyspnea and pleuritic chest pain. Being treated for emphysema by his pulmonologist. Completed 3 months of colchicine 0.6 daily. He was also started on high-dose ibuprofen but self-stopped it within 1 week on his own when symptoms improved.  Still has ankle swelling worse in the evening. Denies fever, chills, palpitation, PND, syncope, or unexpected falls. Does not recall having had a heart attack or stroke. Former smoker, 0.5 ppd x 47 years and quit at the beginning of the year. Denies alcohol or substance.  Of note, patient and wife are extremely frustrated with taking any medications and want to explore if they can come off of the pills and are very suspicious of adding  "additional therapy such as lipid lowering medications.    REVIEW OF SYSTEMS     Review of Systems   Constitutional: Negative for weight loss.   Cardiovascular:  Positive for chest pain, dyspnea on exertion and leg swelling. Negative for irregular heartbeat, orthopnea, palpitations, paroxysmal nocturnal dyspnea and syncope.   Respiratory:  Positive for shortness of breath. Negative for cough and sputum production.    Hematologic/Lymphatic: Negative for bleeding problem.   Musculoskeletal:  Negative for falls.   Gastrointestinal:  Negative for hematochezia and melena.   Genitourinary:  Negative for hematuria.   Neurological:  Negative for dizziness and light-headedness.   Psychiatric/Behavioral:  Negative for altered mental status.      MEDICATIONS      Outpatient Encounter Medications as of 7/12/2024   Medication Sig Dispense Refill    amLODIPine-benazepril (LOTREL) 10-40 MG per capsule Take 1 capsule by mouth Daily.      furosemide (LASIX) 20 MG tablet Take 1 tablet by mouth Daily.      ibuprofen (ADVIL,MOTRIN) 600 MG tablet Take 1 tablet by mouth 2 (Two) Times a Day. 60 tablet 2    metoprolol succinate XL (TOPROL-XL) 100 MG 24 hr tablet Take 1 tablet by mouth Daily.       No facility-administered encounter medications on file as of 7/12/2024.         VITAL SIGNS     Visit Vitals  /80   Pulse 59   Ht 180.3 cm (71\")   Wt 105 kg (231 lb)   SpO2 97%   BMI 32.22 kg/m²         Wt Readings from Last 3 Encounters:   07/12/24 105 kg (231 lb)   05/31/24 104 kg (228 lb 6.4 oz)   04/12/24 97.1 kg (214 lb)     Body mass index is 32.22 kg/m².      PHYSICAL EXAMINATION     Vitals and nursing note reviewed.   Constitutional:       Appearance: Not in distress.   Neck:      Vascular: No JVR.   Pulmonary:      Effort: Pulmonary effort is normal.      Breath sounds: Normal breath sounds. No wheezing. No rhonchi. No rales.   Cardiovascular:      Normal rate. Regular rhythm.      Murmurs: There is no murmur.   Edema:     " "Peripheral edema present.     Ankle: bilateral trace edema of the ankle.  Abdominal:      General: There is no distension.      Palpations: Abdomen is soft.      Tenderness: There is no abdominal tenderness.   Musculoskeletal:      Cervical back: Neck supple. Skin:     General: Skin is cool.   Neurological:      Mental Status: Alert and oriented to person, place and time.           REVIEWED DATA     Procedures    Lab Results   Component Value Date    WBC 5.97 04/15/2024    HGB 11.0 (L) 04/15/2024    HCT 35.2 (L) 04/15/2024    MCV 81.1 04/15/2024     04/15/2024       No results found for: \"HGBA1C\"    Lab Results   Component Value Date    GLUCOSE 154 (H) 04/26/2024    BUN 15 04/26/2024    CREATININE 1.09 04/26/2024    EGFRRESULT 73 03/01/2024    EGFR 76.7 04/26/2024    BCR 13.8 04/26/2024    K 3.5 04/26/2024    CO2 25.0 04/26/2024    CALCIUM 9.0 04/26/2024    PROTENTOTREF 6.2 03/01/2024    ALBUMIN 3.9 03/01/2024    BILITOT 0.9 03/01/2024    AST 19 03/01/2024    ALT 18 03/01/2024       Lab Results   Component Value Date    CHOL 154 04/15/2024    TRIG 187 (H) 04/15/2024    HDL 25 (L) 04/15/2024    LDL 96 04/15/2024       Limited echo (4/12/2024, [facility]):  Summary:                 Normal left ventricular cavity with overall normal systolic function, EF 61%                            Diastolic function not assessed                            Mild mitral/tricuspid regurgitation, RVSP 33mmHg                            Normal function of other valves.                            No evidence of pericardial effusion.       ASSESSMENT & PLAN     Diagnoses and all orders for this visit:    1. Primary hypertension (Primary)    2. Pericardial effusion    3. Concentric left ventricular hypertrophy    4. S/P pericardiocentesis    5. S/P thoracentesis    6. Gastroesophageal reflux disease, unspecified whether esophagitis present       Pericardial effusion s/p pericardiocentesis: Hospitalization at [place] 3/2024 for dyspnea " "with new onset pericardial effusion requiring pericardiocentesis and removal of 650 cc of straw-colored fluid.  Patient had symptoms concerning for associated pericarditis and was on colchicine 0.6 bid but pt self-discontinued due to coughing fit and sore throat.  Repeat echo 4/2024 showed resolution pericardial effusion. Patient was restarted on colchicine at reduced dose of 0.6 daily which she tolerated well and completed a 3-month course.  He was also started as well as high-dose ibuprofen with resolution of his symptoms but he discontinued prematurely on his own.  Serial CRP showed resolution.  He continues to have pleuritic chest pain and dyspnea which may be related to the emphysema lung scarring from extensive prior smoking.  We will further evaluate with repeat CXR, echocardiogram as well as labs including BMET, proBNP to help guide further therapy adjustment.  Pleural effusion s/p thoracentesis, repeat CXR 4/2024 showed small right pleural effusion and decreased small left pleural effusion.  Repeat echo and CXR as above.  HFpEF: Diagnosed recently in the setting of pericardial effusion.  Patient appears clinically compensated, chronic ankle swelling likely in part due to amlodipine therapy.  Repeat echo as above.  Continue PO furosemide 20 daily, may add spironolactone depending on echo findings, BP control see below.  Hypertension: In office /80, borderline elevated, HR 59. On amlodipine-benazepril 10-40 daily, Toprol  daily in addition to diuretic as above, repeat BMET and proBNP as above.  ASCVD risk: Lipids in 4/2024 showed HDL 25, LDL 96, reasonable.  Will also check LP(a) and hemoglobin A1c for further risk stratification, although patient and wife are extremely suspect of statin or moderate medications in general and prefers \" natural\" solutions.  We did have a extensive discussion on the merits of medications but also explored in depth the roles of weight loss, sodium reduction and " dietary improvements to help achieve those goals.  Tobacco abuse: Longstanding history of abuse with over 40 years, patient quit again earlier this year, emphasized importance of complete and sustained abstinence.  Patient has remained tobacco free since last visit.  Overweight/obesity: BMI 32. Discussed lifestyle modification including dietary changes to assist with weight loss.    I spent 46 minutes caring for Sukhdeep on this date of service. This time includes time spent by me in the following activities: preparing for the visit, reviewing tests, obtaining and/or reviewing a separately obtained history, performing a medically appropriate examination and/or evaluation, counseling and educating the patient/family/caregiver, and ordering medications, tests, or procedures.     Ron Cm MD. PhD. [facility]  07/12/24  08:47 EDT    Part of this note may be an electronic transcription/translation of spoken language to printed text using the Dragon dictation system.

## 2024-07-15 RX ORDER — METOPROLOL SUCCINATE 100 MG/1
100 TABLET, EXTENDED RELEASE ORAL DAILY
Status: CANCELLED | OUTPATIENT
Start: 2024-07-15

## 2024-07-16 RX ORDER — METOPROLOL SUCCINATE 100 MG/1
100 TABLET, EXTENDED RELEASE ORAL DAILY
Qty: 130 TABLET | Refills: 3 | Status: SHIPPED | OUTPATIENT
Start: 2024-07-16

## 2024-07-16 RX ORDER — AMLODIPINE AND BENAZEPRIL HYDROCHLORIDE 10; 40 MG/1; MG/1
1 CAPSULE ORAL DAILY
Qty: 90 CAPSULE | Refills: 3 | Status: SHIPPED | OUTPATIENT
Start: 2024-07-16

## 2024-07-31 ENCOUNTER — HOSPITAL ENCOUNTER (OUTPATIENT)
Dept: CARDIOLOGY | Facility: HOSPITAL | Age: 64
Discharge: HOME | End: 2024-07-31
Admitting: INTERNAL MEDICINE
Payer: COMMERCIAL

## 2024-07-31 VITALS
HEIGHT: 71 IN | BODY MASS INDEX: 32.34 KG/M2 | HEART RATE: 70 BPM | DIASTOLIC BLOOD PRESSURE: 78 MMHG | WEIGHT: 231 LBS | SYSTOLIC BLOOD PRESSURE: 124 MMHG

## 2024-07-31 DIAGNOSIS — K21.9 GASTROESOPHAGEAL REFLUX DISEASE, UNSPECIFIED WHETHER ESOPHAGITIS PRESENT: ICD-10-CM

## 2024-07-31 DIAGNOSIS — Z98.890 S/P THORACENTESIS: ICD-10-CM

## 2024-07-31 DIAGNOSIS — I10 PRIMARY HYPERTENSION: ICD-10-CM

## 2024-07-31 DIAGNOSIS — Z98.890 S/P PERICARDIOCENTESIS: ICD-10-CM

## 2024-07-31 DIAGNOSIS — I31.39 PERICARDIAL EFFUSION: ICD-10-CM

## 2024-07-31 LAB
AORTIC DIMENSIONLESS INDEX: 0.7 (DI)
ASCENDING AORTA: 3.3 CM
BH CV ECHO MEAS - ACS: 2.09 CM
BH CV ECHO MEAS - AI P1/2T: 879.2 MSEC
BH CV ECHO MEAS - AO MAX PG: 10 MMHG
BH CV ECHO MEAS - AO MEAN PG: 5.9 MMHG
BH CV ECHO MEAS - AO ROOT DIAM: 3.9 CM
BH CV ECHO MEAS - AO V2 MAX: 158.1 CM/SEC
BH CV ECHO MEAS - AO V2 VTI: 39.8 CM
BH CV ECHO MEAS - AVA(I,D): 2.13 CM2
BH CV ECHO MEAS - EDV(CUBED): 116.1 ML
BH CV ECHO MEAS - EDV(MOD-SP2): 100 ML
BH CV ECHO MEAS - EDV(MOD-SP4): 88 ML
BH CV ECHO MEAS - EF(MOD-BP): 62.6 %
BH CV ECHO MEAS - EF(MOD-SP2): 65 %
BH CV ECHO MEAS - EF(MOD-SP4): 63.6 %
BH CV ECHO MEAS - ESV(MOD-SP2): 35 ML
BH CV ECHO MEAS - ESV(MOD-SP4): 32 ML
BH CV ECHO MEAS - FS: 33.8 %
BH CV ECHO MEAS - IVS/LVPW: 0.88 CM
BH CV ECHO MEAS - IVSD: 0.8 CM
BH CV ECHO MEAS - LAT PEAK E' VEL: 9.9 CM/SEC
BH CV ECHO MEAS - LV DIASTOLIC VOL/BSA (35-75): 39.2 CM2
BH CV ECHO MEAS - LV MASS(C)D: 141.9 GRAMS
BH CV ECHO MEAS - LV MAX PG: 5.3 MMHG
BH CV ECHO MEAS - LV MEAN PG: 2.7 MMHG
BH CV ECHO MEAS - LV SYSTOLIC VOL/BSA (12-30): 14.3 CM2
BH CV ECHO MEAS - LV V1 MAX: 115.4 CM/SEC
BH CV ECHO MEAS - LV V1 VTI: 26.2 CM
BH CV ECHO MEAS - LVIDD: 4.9 CM
BH CV ECHO MEAS - LVIDS: 3.2 CM
BH CV ECHO MEAS - LVOT AREA: 3.2 CM2
BH CV ECHO MEAS - LVOT DIAM: 2.03 CM
BH CV ECHO MEAS - LVPWD: 0.91 CM
BH CV ECHO MEAS - MED PEAK E' VEL: 7.2 CM/SEC
BH CV ECHO MEAS - MR MAX PG: 39.5 MMHG
BH CV ECHO MEAS - MR MAX VEL: 314.3 CM/SEC
BH CV ECHO MEAS - MV A DUR: 0.16 SEC
BH CV ECHO MEAS - MV A MAX VEL: 113.4 CM/SEC
BH CV ECHO MEAS - MV DEC SLOPE: 539.9 CM/SEC2
BH CV ECHO MEAS - MV DEC TIME: 0.19 SEC
BH CV ECHO MEAS - MV E MAX VEL: 90.3 CM/SEC
BH CV ECHO MEAS - MV E/A: 0.8
BH CV ECHO MEAS - MV MAX PG: 5.2 MMHG
BH CV ECHO MEAS - MV MEAN PG: 1.88 MMHG
BH CV ECHO MEAS - MV P1/2T: 54.2 MSEC
BH CV ECHO MEAS - MV V2 VTI: 30.9 CM
BH CV ECHO MEAS - MVA(P1/2T): 4.1 CM2
BH CV ECHO MEAS - MVA(VTI): 2.8 CM2
BH CV ECHO MEAS - PA V2 MAX: 93.7 CM/SEC
BH CV ECHO MEAS - PULM A REVS DUR: 0.12 SEC
BH CV ECHO MEAS - PULM A REVS VEL: 36.1 CM/SEC
BH CV ECHO MEAS - PULM DIAS VEL: 30.7 CM/SEC
BH CV ECHO MEAS - PULM S/D: 1.01
BH CV ECHO MEAS - PULM SYS VEL: 31.1 CM/SEC
BH CV ECHO MEAS - RAP SYSTOLE: 3 MMHG
BH CV ECHO MEAS - RV MAX PG: 2.38 MMHG
BH CV ECHO MEAS - RV V1 MAX: 77.1 CM/SEC
BH CV ECHO MEAS - RV V1 VTI: 20.6 CM
BH CV ECHO MEAS - RVSP: 29.6 MMHG
BH CV ECHO MEAS - SV(LVOT): 84.9 ML
BH CV ECHO MEAS - SV(MOD-SP2): 65 ML
BH CV ECHO MEAS - SV(MOD-SP4): 56 ML
BH CV ECHO MEAS - SVI(LVOT): 37.9 ML/M2
BH CV ECHO MEAS - SVI(MOD-SP2): 29 ML/M2
BH CV ECHO MEAS - SVI(MOD-SP4): 25 ML/M2
BH CV ECHO MEAS - TAPSE (>1.6): 3.2 CM
BH CV ECHO MEAS - TR MAX PG: 26.6 MMHG
BH CV ECHO MEAS - TR MAX VEL: 257.9 CM/SEC
BH CV ECHO MEASUREMENTS AVERAGE E/E' RATIO: 10.56
BH CV XLRA - RV BASE: 4.2 CM
BH CV XLRA - RV LENGTH: 7.3 CM
BH CV XLRA - RV MID: 2.9 CM
BH CV XLRA - TDI S': 8.2 CM/SEC
LEFT ATRIUM VOLUME INDEX: 29.9 ML/M2
SINUS: 3.6 CM
STJ: 3.1 CM

## 2024-07-31 PROCEDURE — 93306 TTE W/DOPPLER COMPLETE: CPT

## 2024-09-03 NOTE — TELEPHONE ENCOUNTER
Caller: Sukhdeep Boyd    Relationship: Self    Best call back number:  [phone]    Requested Prescriptions:   Requested Prescriptions     Pending Prescriptions Disp Refills    metoprolol succinate XL (TOPROL-XL) 100 MG 24 hr tablet 130 tablet 3     Sig: Take 1 tablet by mouth Daily.    amLODIPine-benazepril (LOTREL) 10-40 MG per capsule 90 capsule 3     Sig: Take 1 capsule by mouth Daily.    furosemide (LASIX) 20 MG tablet       Sig: Take 1 tablet by mouth Daily.        Pharmacy where request should be sent:  PHARMACY [place] [address] [address], [address]ITE [facility] - [phone] [facility] [phone] FX     Last office visit with prescribing clinician: 7/12/2024   Last telemedicine visit with prescribing clinician: Visit date not found   Next office visit with prescribing clinician: 1/17/2025     Additional details provided by patient:  OUT OF MEDICATION    Does the patient have less than a 3 day supply:  [] Yes  [x] No    Would you like a call back once the refill request has been completed: [] Yes [] No    If the office needs to give you a call back, can they leave a voicemail: [] Yes [] No    Jayashree العلي Rep   09/03/24 11:44 EDT

## 2024-09-04 RX ORDER — METOPROLOL SUCCINATE 100 MG/1
100 TABLET, EXTENDED RELEASE ORAL DAILY
Qty: 130 TABLET | Refills: 3 | Status: SHIPPED | OUTPATIENT
Start: 2024-09-04

## 2024-09-04 RX ORDER — FUROSEMIDE 20 MG
20 TABLET ORAL DAILY
Qty: 90 TABLET | Refills: 3 | Status: SHIPPED | OUTPATIENT
Start: 2024-09-04

## 2024-09-04 RX ORDER — AMLODIPINE AND BENAZEPRIL HYDROCHLORIDE 10; 40 MG/1; MG/1
1 CAPSULE ORAL DAILY
Qty: 90 CAPSULE | Refills: 3 | Status: SHIPPED | OUTPATIENT
Start: 2024-09-04

## 2025-01-17 ENCOUNTER — OFFICE VISIT (OUTPATIENT)
Dept: CARDIOLOGY | Facility: CLINIC | Age: 65
End: 2025-01-17
Payer: COMMERCIAL

## 2025-01-17 VITALS
OXYGEN SATURATION: 95 % | HEIGHT: 71 IN | WEIGHT: 247 LBS | DIASTOLIC BLOOD PRESSURE: 82 MMHG | SYSTOLIC BLOOD PRESSURE: 148 MMHG | BODY MASS INDEX: 34.58 KG/M2 | HEART RATE: 66 BPM

## 2025-01-17 DIAGNOSIS — K21.9 GASTROESOPHAGEAL REFLUX DISEASE, UNSPECIFIED WHETHER ESOPHAGITIS PRESENT: ICD-10-CM

## 2025-01-17 DIAGNOSIS — I30.0 ACUTE IDIOPATHIC PERICARDITIS: Primary | ICD-10-CM

## 2025-01-17 DIAGNOSIS — I31.39 PERICARDIAL EFFUSION: ICD-10-CM

## 2025-01-17 DIAGNOSIS — Z98.890 S/P THORACENTESIS: ICD-10-CM

## 2025-01-17 DIAGNOSIS — Z98.890 S/P PERICARDIOCENTESIS: ICD-10-CM

## 2025-01-17 DIAGNOSIS — I10 PRIMARY HYPERTENSION: ICD-10-CM

## 2025-01-17 DIAGNOSIS — I51.7 CONCENTRIC LEFT VENTRICULAR HYPERTROPHY: ICD-10-CM

## 2025-01-17 RX ORDER — HYDROCHLOROTHIAZIDE 25 MG/1
25 TABLET ORAL DAILY
Qty: 90 TABLET | Refills: 3 | Status: SHIPPED | OUTPATIENT
Start: 2025-01-17

## 2025-01-17 NOTE — PROGRESS NOTES
CARDIOLOGY    Ron Cm MD    ENCOUNTER DATE:  01/17/25    Sukhdeep Boyd / 64 y.o. / male        CHIEF COMPLAINT / REASON FOR OFFICE VISIT     Hypertension      HISTORY OF PRESENT ILLNESS       Hypertension  Associated symptoms include shortness of breath. Pertinent negatives include no chest pain, orthopnea, palpitations or PND.     Sukhdeep Boyd is a 64 y.o. male with HFpEF, pericardial effusion s/p pericardiocentesis, hypertension, overweight, tobacco abuse who presents for clinic follow-up.    Summary of cardiovascular history:    Per Terri, pt was hospitalized in 2/2024 for pneumonia and then 3/15/2024 - 3/20/2024 at [facility] for new onset of HFpEF with pericardial effusion and pleural effusions. He underwent pericardiocentesis on 3/16/24 with removal of 650 mL of straw-colored fluid. He was started on colchicine daily. He underwent thoracentesis of left pleural effusion on 3/18 with removal of 1300 mL of clear straw-colored fluid. He was discharged on colchicine 0.6 mg twice daily.      - 4/12/2024: Follow-up echo showed no residual pericardial effusion from [facility].  - 7/2024: Last clinic follow-up, doing okay.    Unaccompanied at the visit today. Today, patient has no acute complaints. 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 Chronic dyspnea, stable. No recurrent pleuritic chest pain. Being treated for emphysema by his pulmonologist. He was able to shovel snow without difficulty. Ankle swelling has improved. Denies palpitation, PND, syncope, bleeding, dizziness, syncope, or unexpected falls. Does not recall having had a heart attack or stroke. Former smoker, 0.5 ppd x 47 years and quit at the beginning of the last year. Denies alcohol or substance.      REVIEW OF SYSTEMS     Review of Systems   Constitutional: Negative for weight loss.   Cardiovascular:  Positive for leg swelling. Negative for chest pain, dyspnea on exertion, irregular heartbeat,  "orthopnea, palpitations, paroxysmal nocturnal dyspnea and syncope.   Respiratory:  Positive for shortness of breath. Negative for cough and sputum production.    Hematologic/Lymphatic: Negative for bleeding problem.   Musculoskeletal:  Negative for falls.   Gastrointestinal:  Negative for hematochezia and melena.   Genitourinary:  Negative for hematuria.   Neurological:  Negative for dizziness and light-headedness.   Psychiatric/Behavioral:  Negative for altered mental status.      MEDICATIONS      Outpatient Encounter Medications as of 1/17/2025   Medication Sig Dispense Refill    amLODIPine-benazepril (LOTREL) 10-40 MG per capsule Take 1 capsule by mouth Daily. 90 capsule 3    furosemide (LASIX) 20 MG tablet Take 1 tablet by mouth Daily. (Patient taking differently: Take 1 tablet by mouth Daily. Patient states that he hasn't been taking it, but does have it as PRN.) 90 tablet 3    ibuprofen (ADVIL,MOTRIN) 600 MG tablet Take 1 tablet by mouth 2 (Two) Times a Day. 60 tablet 2    metoprolol succinate XL (TOPROL-XL) 100 MG 24 hr tablet Take 1 tablet by mouth Daily. 130 tablet 3     No facility-administered encounter medications on file as of 1/17/2025.         VITAL SIGNS     Visit Vitals  /82   Pulse 66   Ht 180.3 cm (71\")   Wt 112 kg (247 lb)   SpO2 95%   BMI 34.45 kg/m²           Wt Readings from Last 3 Encounters:   01/17/25 112 kg (247 lb)   07/31/24 105 kg (231 lb)   07/12/24 105 kg (231 lb)     Body mass index is 34.45 kg/m².      PHYSICAL EXAMINATION     Vitals and nursing note reviewed.   Constitutional:       Appearance: Not in distress.   Neck:      Vascular: No JVR.   Pulmonary:      Effort: Pulmonary effort is normal.      Breath sounds: Normal breath sounds. No wheezing. No rhonchi. No rales.   Cardiovascular:      Normal rate. Regular rhythm.      Murmurs: There is no murmur.   Edema:     Peripheral edema absent.   Abdominal:      General: There is no distension.      Palpations: Abdomen is soft. " "     Tenderness: There is no abdominal tenderness.   Musculoskeletal:      Cervical back: Neck supple. Skin:     General: Skin is cool.   Neurological:      Mental Status: Alert and oriented to person, place and time.           REVIEWED DATA     Procedures    Lab Results   Component Value Date    WBC 5.97 04/15/2024    HGB 11.0 (L) 04/15/2024    HCT 35.2 (L) 04/15/2024    MCV 81.1 04/15/2024     04/15/2024       No results found for: \"HGBA1C\"    Lab Results   Component Value Date    GLUCOSE 154 (H) 04/26/2024    BUN 15 04/26/2024    CREATININE 1.09 04/26/2024    EGFRRESULT 73 03/01/2024    EGFR 76.7 04/26/2024    BCR 13.8 04/26/2024    K 3.5 04/26/2024    CO2 25.0 04/26/2024    CALCIUM 9.0 04/26/2024    PROTENTOTREF 6.2 03/01/2024    ALBUMIN 3.9 03/01/2024    BILITOT 0.9 03/01/2024    AST 19 03/01/2024    ALT 18 03/01/2024       Lab Results   Component Value Date    CHOL 154 04/15/2024    TRIG 187 (H) 04/15/2024    HDL 25 (L) 04/15/2024    LDL 96 04/15/2024       Results for orders placed during the hospital encounter of 07/31/24    Adult Transthoracic Echo Complete w/ Color, Spectral and Contrast if necessary per protocol    Interpretation Summary    Left ventricular systolic function is normal. Calculated left ventricular EF = 62.6%    Left ventricular diastolic function is consistent with (grade I) impaired relaxation.    No significant valvular abnormalities.    Estimated right ventricular systolic pressure from tricuspid regurgitation is normal (<35 mmHg).    No significant pericardial effusion.    Limited echo (4/12/2024, [facility]):  Summary:                 Normal left ventricular cavity with overall normal systolic function, EF 61%                            Diastolic function not assessed                            Mild mitral/tricuspid regurgitation, RVSP 33mmHg                            Normal function of other valves.                            No evidence of pericardial effusion.       ASSESSMENT " & PLAN     Diagnoses and all orders for this visit:    1. Acute idiopathic pericarditis (Primary)    2. Concentric left ventricular hypertrophy    3. Pericardial effusion    4. Primary hypertension    5. S/P pericardiocentesis    6. Gastroesophageal reflux disease, unspecified whether esophagitis present    7. S/P thoracentesis       Pericardial effusion s/p pericardiocentesis: Hospitalization at [facility] 3/2024 for dyspnea with new onset pericardial effusion requiring pericardiocentesis and removal of 650 cc of straw-colored fluid.  Patient had symptoms concerning for associated pericarditis and was on colchicine 0.6 bid but pt self-discontinued due to coughing fit and sore throat.  Patient was restarted on colchicine at reduced dose of 0.6 daily which he tolerated well and completed a 3-month course.  He was also started as well as high-dose ibuprofen with resolution of his symptoms but he discontinued prematurely on his own.  Serial CRP showed resolution. Repeat echo 4/2024 and 7/2024 both showed resolution pericardial effusion.   No recurrent chest pain, chronic but stable dyspnea which may be related to the emphysema lung scarring from extensive prior smoking.  Continue to monitor.  Pleural effusion s/p thoracentesis, repeat CXR 4/2024 showed small right pleural effusion and decreased small left pleural effusion.  Repeat echo and CXR as above.  HFpEF: Diagnosed last year in the setting of pericardial effusion.  Patient appears euvolemic on exam.  Patient has as needed furosemide but has not needed it. He also reports taking certain supplement over-the-counter although he cannot name it.  Consider repeating BMET yearly if clinically indicated.  Hypertension: In office /82, elevated, HR 66. Home BP similar. On amlodipine-benazepril 10-40 daily, Toprol  daily in addition to as needed diuretic as above.   We will add HCTZ 25 daily to help with better BP control and encourage patient call back in 1-2 weeks  with home vitals and symptom update.  ASCVD risk: Lipids last year in 4/2024 showed HDL 25, LDL 96, reasonable.  10-year ASCVD risk 20%, we discussed indications for statin but patient adamantly refuses that.  Tobacco abuse: Longstanding history of abuse with over 40 years, patient quit again last year, emphasized importance of complete and sustained abstinence.  Patient has remained tobacco free since last visit.  Overweight/obesity: BMI 32. Discussed lifestyle modification including dietary changes to assist with weight loss.    I spent 42 minutes caring for Sukhdeep on this date of service. This time includes time spent by me in the following activities: preparing for the visit, reviewing tests, obtaining and/or reviewing a separately obtained history, performing a medically appropriate examination and/or evaluation, counseling and educating the patient/family/caregiver, and ordering medications, tests, or procedures.     Additionally, I am providing longitudinal care which includes continuously being a focal point for all of the patient's health care services and ongoing medical care related to hypertension hyperlipidemia as documented above.    Ron Cm MD. PhD. [facility]  01/17/25  10:23 EST    Part of this note may be an electronic transcription/translation of spoken language to printed text using the Dragon dictation system.

## 2025-08-08 ENCOUNTER — OFFICE VISIT (OUTPATIENT)
Dept: CARDIOLOGY | Facility: CLINIC | Age: 65
End: 2025-08-08
Payer: COMMERCIAL

## 2025-08-08 VITALS
OXYGEN SATURATION: 96 % | DIASTOLIC BLOOD PRESSURE: 86 MMHG | HEART RATE: 60 BPM | WEIGHT: 231.8 LBS | HEIGHT: 71 IN | BODY MASS INDEX: 32.45 KG/M2 | SYSTOLIC BLOOD PRESSURE: 146 MMHG

## 2025-08-08 DIAGNOSIS — K21.9 GASTROESOPHAGEAL REFLUX DISEASE, UNSPECIFIED WHETHER ESOPHAGITIS PRESENT: ICD-10-CM

## 2025-08-08 DIAGNOSIS — Z98.890 S/P PERICARDIOCENTESIS: ICD-10-CM

## 2025-08-08 DIAGNOSIS — I31.39 PERICARDIAL EFFUSION: ICD-10-CM

## 2025-08-08 DIAGNOSIS — I51.7 CONCENTRIC LEFT VENTRICULAR HYPERTROPHY: Primary | ICD-10-CM

## 2025-08-08 DIAGNOSIS — I10 PRIMARY HYPERTENSION: ICD-10-CM
